# Patient Record
Sex: MALE | Race: WHITE | NOT HISPANIC OR LATINO | URBAN - METROPOLITAN AREA
[De-identification: names, ages, dates, MRNs, and addresses within clinical notes are randomized per-mention and may not be internally consistent; named-entity substitution may affect disease eponyms.]

---

## 2020-07-25 ENCOUNTER — TELEPHONE ENCOUNTER (OUTPATIENT)
Dept: URBAN - METROPOLITAN AREA CLINIC 13 | Facility: CLINIC | Age: 40
End: 2020-07-25

## 2020-07-26 ENCOUNTER — TELEPHONE ENCOUNTER (OUTPATIENT)
Dept: URBAN - METROPOLITAN AREA CLINIC 13 | Facility: CLINIC | Age: 40
End: 2020-07-26

## 2020-07-26 RX ORDER — HYDROCORTISONE ACETATE 25 MG/1
INSERT 1 SUPPOSITORY RECTALLY AT BEDTIME AS NEEDED SUPPOSITORY RECTAL
Qty: 14 | Refills: 1 | Status: ACTIVE | COMMUNITY
Start: 2019-03-05

## 2020-07-26 RX ORDER — SACCHAROMYCES BOULARDII 250 MG
TAKE 1 CAPSULE DAILY CAPSULE ORAL
Refills: 0 | Status: ACTIVE | COMMUNITY

## 2020-07-26 RX ORDER — DEXLANSOPRAZOLE 30 MG/1
TAKE 1 CAPSULE DAILY EVERY MORNING BEFORE BREAKFAST CAPSULE, DELAYED RELEASE ORAL
Qty: 90 | Refills: 3 | Status: ACTIVE | COMMUNITY
Start: 2017-01-06

## 2021-09-27 ENCOUNTER — TELEPHONE ENCOUNTER (OUTPATIENT)
Dept: URBAN - METROPOLITAN AREA CLINIC 113 | Facility: CLINIC | Age: 41
End: 2021-09-27

## 2021-09-28 ENCOUNTER — WEB ENCOUNTER (OUTPATIENT)
Dept: URBAN - METROPOLITAN AREA CLINIC 113 | Facility: CLINIC | Age: 41
End: 2021-09-28

## 2021-09-28 ENCOUNTER — OFFICE VISIT (OUTPATIENT)
Dept: URBAN - METROPOLITAN AREA CLINIC 113 | Facility: CLINIC | Age: 41
End: 2021-09-28
Payer: COMMERCIAL

## 2021-09-28 ENCOUNTER — LAB OUTSIDE AN ENCOUNTER (OUTPATIENT)
Dept: URBAN - METROPOLITAN AREA CLINIC 113 | Facility: CLINIC | Age: 41
End: 2021-09-28

## 2021-09-28 VITALS
DIASTOLIC BLOOD PRESSURE: 85 MMHG | RESPIRATION RATE: 18 BRPM | HEART RATE: 66 BPM | WEIGHT: 228 LBS | HEIGHT: 69 IN | SYSTOLIC BLOOD PRESSURE: 126 MMHG | BODY MASS INDEX: 33.77 KG/M2 | TEMPERATURE: 98 F

## 2021-09-28 DIAGNOSIS — K21.9 GERD (GASTROESOPHAGEAL REFLUX DISEASE): ICD-10-CM

## 2021-09-28 PROCEDURE — 99214 OFFICE O/P EST MOD 30 MIN: CPT | Performed by: INTERNAL MEDICINE

## 2021-09-28 RX ORDER — DEXLANSOPRAZOLE 30 MG/1
TAKE 1 CAPSULE DAILY EVERY MORNING BEFORE BREAKFAST CAPSULE, DELAYED RELEASE ORAL
Qty: 90 | Refills: 3 | Status: ACTIVE | COMMUNITY
Start: 2017-01-06

## 2021-09-28 RX ORDER — SACCHAROMYCES BOULARDII 250 MG
TAKE 1 CAPSULE DAILY CAPSULE ORAL
Refills: 0 | Status: DISCONTINUED | COMMUNITY

## 2021-09-28 RX ORDER — HYDROCORTISONE ACETATE 25 MG/1
_INSERT 1 SUPPOSITORY RECTALLY AT BEDTIME AS NEEDED SUPPOSITORY RECTAL
Qty: 14 | Refills: 1 | Status: DISCONTINUED | COMMUNITY
Start: 2019-03-05

## 2021-09-28 NOTE — HPI-TODAY'S VISIT:
Very pleasant 41-year-old man presents with a longstanding history of gastroesophageal reflux disease.  He is currently on dexilant.  He has never had a upper endoscopy for Chavira's screening.  He has had acid reflux requiring PPI therapy for more than 8 years.  Weight is currently stable.  No nausea or vomiting.  No reports of dysphagia.  Appetite is fine.  No reports of early satiety.  No abdominal pain at this time.  No reports of diarrhea or constipation.  No rectal bleeding or melena . Colonoscopy 2014.  Dr. Emanuel.  Negative

## 2021-10-15 ENCOUNTER — OFFICE VISIT (OUTPATIENT)
Dept: URBAN - METROPOLITAN AREA SURGERY CENTER 25 | Facility: SURGERY CENTER | Age: 41
End: 2021-10-15
Payer: COMMERCIAL

## 2021-10-15 ENCOUNTER — CLAIMS CREATED FROM THE CLAIM WINDOW (OUTPATIENT)
Dept: URBAN - METROPOLITAN AREA CLINIC 4 | Facility: CLINIC | Age: 41
End: 2021-10-15
Payer: COMMERCIAL

## 2021-10-15 DIAGNOSIS — K31.89 REACTIVE GASTROPATHY: ICD-10-CM

## 2021-10-15 DIAGNOSIS — K31.7 POLYP OF STOMACH AND DUODENUM: ICD-10-CM

## 2021-10-15 DIAGNOSIS — K21.9 ESOPHAGEAL REFLUX: ICD-10-CM

## 2021-10-15 DIAGNOSIS — K31.7 POLYP OF STOMACH FUNDIC GLAND: ICD-10-CM

## 2021-10-15 DIAGNOSIS — K31.89 DEFORMED PYLORUS, ACQUIRED: ICD-10-CM

## 2021-10-15 PROCEDURE — 43251 EGD REMOVE LESION SNARE: CPT | Performed by: INTERNAL MEDICINE

## 2021-10-15 PROCEDURE — 43239 EGD BIOPSY SINGLE/MULTIPLE: CPT | Performed by: INTERNAL MEDICINE

## 2021-10-15 PROCEDURE — 88312 SPECIAL STAINS GROUP 1: CPT | Performed by: PATHOLOGY

## 2021-10-15 PROCEDURE — G8907 PT DOC NO EVENTS ON DISCHARG: HCPCS | Performed by: INTERNAL MEDICINE

## 2021-10-15 PROCEDURE — 88305 TISSUE EXAM BY PATHOLOGIST: CPT | Performed by: PATHOLOGY

## 2021-10-15 RX ORDER — DEXLANSOPRAZOLE 30 MG/1
TAKE 1 CAPSULE DAILY EVERY MORNING BEFORE BREAKFAST CAPSULE, DELAYED RELEASE ORAL
Qty: 90 | Refills: 3 | Status: ACTIVE | COMMUNITY
Start: 2017-01-06

## 2021-10-28 ENCOUNTER — TELEPHONE ENCOUNTER (OUTPATIENT)
Dept: URBAN - METROPOLITAN AREA CLINIC 113 | Facility: CLINIC | Age: 41
End: 2021-10-28

## 2021-12-15 ENCOUNTER — OFFICE VISIT (OUTPATIENT)
Dept: URBAN - METROPOLITAN AREA CLINIC 113 | Facility: CLINIC | Age: 41
End: 2021-12-15
Payer: COMMERCIAL

## 2021-12-15 ENCOUNTER — WEB ENCOUNTER (OUTPATIENT)
Dept: URBAN - METROPOLITAN AREA CLINIC 113 | Facility: CLINIC | Age: 41
End: 2021-12-15

## 2021-12-15 VITALS
TEMPERATURE: 98.2 F | SYSTOLIC BLOOD PRESSURE: 139 MMHG | HEIGHT: 69 IN | BODY MASS INDEX: 33.47 KG/M2 | WEIGHT: 226 LBS | DIASTOLIC BLOOD PRESSURE: 86 MMHG | HEART RATE: 74 BPM

## 2021-12-15 DIAGNOSIS — K21.9 GERD (GASTROESOPHAGEAL REFLUX DISEASE): ICD-10-CM

## 2021-12-15 PROBLEM — 235595009 GASTROESOPHAGEAL REFLUX DISEASE: Status: ACTIVE | Noted: 2021-09-28

## 2021-12-15 PROCEDURE — 99213 OFFICE O/P EST LOW 20 MIN: CPT | Performed by: NURSE PRACTITIONER

## 2021-12-15 RX ORDER — DEXLANSOPRAZOLE 30 MG/1
TAKE 1 CAPSULE DAILY EVERY MORNING BEFORE BREAKFAST CAPSULE, DELAYED RELEASE ORAL
Qty: 90 | Refills: 3 | Status: ACTIVE | COMMUNITY
Start: 2017-01-06

## 2021-12-15 NOTE — HPI-TODAY'S VISIT:
41-year-old male with a longstanding history of GERD, currently on Dexilant, presenting for follow-up after an EGD.  An EGD was performed 10/15/2021.  The esophagus was normal.  Regular Z-line 42 cm from the incisors.  Erythematous mucosa in the gastric antrum which was biopsied.  There were 3 gastric polyps that were resected and retrieved.  The duodenum was normal.  Stomach antrum and body biopsies revealed reactive gastropathy negative for H. pylori, intestinal metaplasia, dysplasia or malignancy.  Stomach polyps were fundic gland polyps.   Patient is without any abdominal complaints. No dysphagia, heartburn, regurgitation, unintentional weight loss, nausea, vomiting, hematemesis or melena. Bowels are moving regularly without blood per rectum. No complaints of bloating. No jaundice, icterus.

## 2022-08-25 ENCOUNTER — TELEPHONE ENCOUNTER (OUTPATIENT)
Dept: URBAN - METROPOLITAN AREA CLINIC 72 | Facility: CLINIC | Age: 42
End: 2022-08-25

## 2022-10-19 ENCOUNTER — OFFICE VISIT (OUTPATIENT)
Dept: URBAN - METROPOLITAN AREA CLINIC 113 | Facility: CLINIC | Age: 42
End: 2022-10-19

## 2022-11-29 ENCOUNTER — OFFICE VISIT (OUTPATIENT)
Dept: URBAN - METROPOLITAN AREA CLINIC 113 | Facility: CLINIC | Age: 42
End: 2022-11-29
Payer: COMMERCIAL

## 2022-11-29 VITALS
HEIGHT: 69 IN | BODY MASS INDEX: 34.36 KG/M2 | WEIGHT: 232 LBS | SYSTOLIC BLOOD PRESSURE: 132 MMHG | HEART RATE: 72 BPM | DIASTOLIC BLOOD PRESSURE: 92 MMHG

## 2022-11-29 DIAGNOSIS — K62.5 RECTAL BLEEDING: ICD-10-CM

## 2022-11-29 DIAGNOSIS — K21.9 GASTROESOPHAGEAL REFLUX DISEASE, UNSPECIFIED WHETHER ESOPHAGITIS PRESENT: ICD-10-CM

## 2022-11-29 DIAGNOSIS — K58.2 IRRITABLE BOWEL SYNDROME WITH BOTH CONSTIPATION AND DIARRHEA: ICD-10-CM

## 2022-11-29 PROBLEM — 235595009: Status: ACTIVE | Noted: 2022-11-29

## 2022-11-29 PROCEDURE — 99244 OFF/OP CNSLTJ NEW/EST MOD 40: CPT

## 2022-11-29 PROCEDURE — 99244 OFF/OP CNSLTJ NEW/EST MOD 40: CPT | Performed by: INTERNAL MEDICINE

## 2022-11-29 PROCEDURE — 99214 OFFICE O/P EST MOD 30 MIN: CPT | Performed by: INTERNAL MEDICINE

## 2022-11-29 RX ORDER — DEXLANSOPRAZOLE 30 MG/1
TAKE 1 CAPSULE DAILY EVERY MORNING BEFORE BREAKFAST CAPSULE, DELAYED RELEASE ORAL
OUTPATIENT
Start: 2017-01-06

## 2022-11-29 RX ORDER — DEXLANSOPRAZOLE 30 MG/1
TAKE 1 CAPSULE DAILY EVERY MORNING BEFORE BREAKFAST CAPSULE, DELAYED RELEASE ORAL
Qty: 90 | Refills: 3 | Status: ACTIVE | COMMUNITY
Start: 2017-01-06

## 2022-11-29 RX ORDER — SODIUM, POTASSIUM,MAG SULFATES 17.5-3.13G
354 ML SOLUTION, RECONSTITUTED, ORAL ORAL ONCE
Qty: 354 MILLILITER | Refills: 0 | OUTPATIENT
Start: 2022-11-29 | End: 2022-11-30

## 2022-11-29 NOTE — HPI-OTHER HISTORIES
Labs 8/10/22: normal CBC, Glucose 112, normal CMP, Hgb A1c 5.7, normal TSH, normal PSA, normal Vitamin B12, normal ESR, normal CRP.   EGD was performed 10/15/2021.  The esophagus was normal.  Regular Z-line 42 cm from the incisors.  Erythematous mucosa in the gastric antrum which was biopsied.  There were 3 gastric polyps that were resected and retrieved.  The duodenum was normal.  Stomach antrum and body biopsies revealed reactive gastropathy negative for H. pylori, intestinal metaplasia, dysplasia or malignancy.  Stomach polyps were fundic gland polyps.  Colonoscopy in April 2014: Normal with exception of internal hemorrhoids. Repeat at age 49 for screening purposes.

## 2022-11-29 NOTE — HPI-TODAY'S VISIT:
42-year-old male presents for long interval follow-up and evaluation of GI bleed.  He was last seen on 12/15/2021.  He has a longstanding history of GERD requiring chronic PPI use with Dexilant.  Symptoms were well controlled and EGD performed in October 2021 was negative for evidence of Chavira's esophagus.  Patient has been referred back by Dr. Sesar Chase for evaluation of lower GI bleed.  A copy of today's visit will be forwarded to the referring provider.  He admits to intermittent rectal bleeding. This is noted as bright red blood on the toilet paper, in the commode and in the stool. This occurs once every 1-2 months. He denies rectal pain or abdominal pain. Denies unintentional weight loss. He does have occasional bloating. His bowels alternate between constipation and diarrhea. He is currently having a daily BM. He takes a fiber gummie daily. He does take Dexilant 30 mg once daily for GERD. This is well controlled. He denies any changes to his medical or surgical history.

## 2022-12-21 ENCOUNTER — TELEPHONE ENCOUNTER (OUTPATIENT)
Dept: URBAN - METROPOLITAN AREA CLINIC 113 | Facility: CLINIC | Age: 42
End: 2022-12-21

## 2023-01-26 ENCOUNTER — OFFICE VISIT (OUTPATIENT)
Dept: URBAN - METROPOLITAN AREA SURGERY CENTER 25 | Facility: SURGERY CENTER | Age: 43
End: 2023-01-26
Payer: COMMERCIAL

## 2023-01-26 DIAGNOSIS — K64.0 GRADE I INTERNAL HEMORRHOIDS: ICD-10-CM

## 2023-01-26 DIAGNOSIS — K62.5 RECTAL BLEEDING: ICD-10-CM

## 2023-01-26 PROCEDURE — G8907 PT DOC NO EVENTS ON DISCHARG: HCPCS | Performed by: INTERNAL MEDICINE

## 2023-01-26 PROCEDURE — 45378 DIAGNOSTIC COLONOSCOPY: CPT | Performed by: INTERNAL MEDICINE

## 2023-01-26 RX ORDER — DEXLANSOPRAZOLE 30 MG/1
TAKE 1 CAPSULE DAILY EVERY MORNING BEFORE BREAKFAST CAPSULE, DELAYED RELEASE ORAL
Status: ACTIVE | COMMUNITY
Start: 2017-01-06

## 2023-01-30 ENCOUNTER — TELEPHONE ENCOUNTER (OUTPATIENT)
Dept: URBAN - METROPOLITAN AREA CLINIC 113 | Facility: CLINIC | Age: 43
End: 2023-01-30

## 2023-03-01 ENCOUNTER — OFFICE VISIT (OUTPATIENT)
Dept: URBAN - METROPOLITAN AREA CLINIC 113 | Facility: CLINIC | Age: 43
End: 2023-03-01
Payer: COMMERCIAL

## 2023-03-01 ENCOUNTER — DASHBOARD ENCOUNTERS (OUTPATIENT)
Age: 43
End: 2023-03-01

## 2023-03-01 VITALS
HEART RATE: 66 BPM | BODY MASS INDEX: 34.21 KG/M2 | WEIGHT: 231 LBS | DIASTOLIC BLOOD PRESSURE: 87 MMHG | TEMPERATURE: 97.1 F | SYSTOLIC BLOOD PRESSURE: 125 MMHG | HEIGHT: 69 IN | RESPIRATION RATE: 16 BRPM

## 2023-03-01 DIAGNOSIS — K62.5 RECTAL BLEEDING: ICD-10-CM

## 2023-03-01 DIAGNOSIS — K64.0 GRADE I HEMORRHOIDS: ICD-10-CM

## 2023-03-01 DIAGNOSIS — K58.2 IRRITABLE BOWEL SYNDROME WITH BOTH CONSTIPATION AND DIARRHEA: ICD-10-CM

## 2023-03-01 PROBLEM — 10743008: Status: ACTIVE | Noted: 2022-11-29

## 2023-03-01 PROCEDURE — 99213 OFFICE O/P EST LOW 20 MIN: CPT | Performed by: NURSE PRACTITIONER

## 2023-03-01 RX ORDER — ESOMEPRAZOLE MAGNESIUM 20 MG/1
1 CAPSULE CAPSULE, DELAYED RELEASE ORAL ONCE A DAY
Status: ACTIVE | COMMUNITY

## 2023-03-01 RX ORDER — DEXLANSOPRAZOLE 30 MG/1
TAKE 1 CAPSULE DAILY EVERY MORNING BEFORE BREAKFAST CAPSULE, DELAYED RELEASE ORAL
Status: ON HOLD | COMMUNITY
Start: 2017-01-06

## 2023-03-01 NOTE — HPI-TODAY'S VISIT:
42-year-old male presents for long interval follow-up and evaluation of GI bleed.  He was last seen on 12/15/2021.  He has a longstanding history of GERD requiring chronic PPI use with Dexilant.  Symptoms were well controlled and EGD performed in October 2021 was negative for evidence of Chavira's esophagus.  11/29/22: He admits to intermittent rectal bleeding. This is noted as bright red blood on the toilet paper, in the commode and in the stool. This occurs once every 1-2 months. He denies rectal pain or abdominal pain. Denies unintentional weight loss. He does have occasional bloating. His bowels alternate between constipation and diarrhea. He is currently having a daily BM. He takes a fiber gummie daily. He does take Dexilant 30 mg once daily for GERD. This is well controlled. He denies any changes to his medical or surgical history. He was planned for a colonoscopy. Colonoscopy was performed 1/26/2023.  This was notable for an excellent bowel preparation.  Diverticulosis in the sigmoid colon and descending colon.  Nonbleeding internal hemorrhoids characterized as moderate and grade 1.  No specimens were collected.  Repeat colonoscopy was recommended in 10 years.  Colorectal surgery evaluation with Dr. Gomes was a consideration.  He started fiber supplement 3 months ago. Since doing so, his blood per rectum is resolved. He is without any GI complaints. He stopped Dexilant due to cost. He is taking Nexium over the counter with good control of reflux symptoms.